# Patient Record
Sex: FEMALE
[De-identification: names, ages, dates, MRNs, and addresses within clinical notes are randomized per-mention and may not be internally consistent; named-entity substitution may affect disease eponyms.]

---

## 2022-07-20 PROBLEM — Z00.00 ENCOUNTER FOR PREVENTIVE HEALTH EXAMINATION: Status: ACTIVE | Noted: 2022-07-20

## 2022-08-02 ENCOUNTER — APPOINTMENT (OUTPATIENT)
Dept: BREAST CENTER | Facility: CLINIC | Age: 27
End: 2022-08-02

## 2022-08-02 VITALS
BODY MASS INDEX: 24.75 KG/M2 | WEIGHT: 145 LBS | SYSTOLIC BLOOD PRESSURE: 102 MMHG | HEIGHT: 64 IN | DIASTOLIC BLOOD PRESSURE: 61 MMHG | HEART RATE: 64 BPM

## 2022-08-02 DIAGNOSIS — N64.4 MASTODYNIA: ICD-10-CM

## 2022-08-02 DIAGNOSIS — Z78.9 OTHER SPECIFIED HEALTH STATUS: ICD-10-CM

## 2022-08-02 DIAGNOSIS — N63.10 UNSPECIFIED LUMP IN THE RIGHT BREAST, UNSPECIFIED QUADRANT: ICD-10-CM

## 2022-08-02 DIAGNOSIS — Z63.5 DISRUPTION OF FAMILY BY SEPARATION AND DIVORCE: ICD-10-CM

## 2022-08-02 DIAGNOSIS — N63.20 UNSPECIFIED LUMP IN THE RIGHT BREAST, UNSPECIFIED QUADRANT: ICD-10-CM

## 2022-08-02 DIAGNOSIS — Z80.3 FAMILY HISTORY OF MALIGNANT NEOPLASM OF BREAST: ICD-10-CM

## 2022-08-02 DIAGNOSIS — Z80.9 FAMILY HISTORY OF MALIGNANT NEOPLASM, UNSPECIFIED: ICD-10-CM

## 2022-08-02 PROCEDURE — 99204 OFFICE O/P NEW MOD 45 MIN: CPT

## 2022-08-02 RX ORDER — CLOBETASOL PROPIONATE 0.5 MG/ML
0.05 SOLUTION TOPICAL
Qty: 100 | Refills: 0 | Status: DISCONTINUED | COMMUNITY
Start: 2022-04-27

## 2022-08-02 RX ORDER — FLUTICASONE FUROATE 200 UG/1
200 POWDER RESPIRATORY (INHALATION)
Qty: 30 | Refills: 0 | Status: DISCONTINUED | COMMUNITY
Start: 2022-05-02

## 2022-08-02 RX ORDER — SPIRONOLACTONE 50 MG/1
50 TABLET ORAL
Qty: 180 | Refills: 0 | Status: DISCONTINUED | COMMUNITY
Start: 2022-07-23

## 2022-08-02 RX ORDER — ALBUTEROL SULFATE 90 UG/1
108 (90 BASE) INHALANT RESPIRATORY (INHALATION)
Qty: 7 | Refills: 0 | Status: DISCONTINUED | COMMUNITY
Start: 2022-05-02

## 2022-08-02 RX ORDER — BUDESONIDE, GLYCOPYRROLATE, AND FORMOTEROL FUMARATE 160; 9; 4.8 UG/1; UG/1; UG/1
160-9-4.8 AEROSOL, METERED RESPIRATORY (INHALATION)
Qty: 11 | Refills: 0 | Status: DISCONTINUED | COMMUNITY
Start: 2022-04-14

## 2022-08-02 SDOH — SOCIAL STABILITY - SOCIAL INSECURITY: DISRUPTION OF FAMILY BY SEPARATION AND DIVORCE: Z63.5

## 2022-08-02 NOTE — PAST MEDICAL HISTORY
[Menarche Age ____] : age at menarche was [unfilled] [Regular Cycle Intervals] : have been regular [Total Preg ___] : G[unfilled] [Live Births ___] : P[unfilled]  [Age At Live Birth ___] : Age at live birth: [unfilled] [FreeTextEntry8] : 1 yr

## 2022-08-02 NOTE — CONSULT LETTER
[Dear  ___] : Dear  [unfilled], [Consult Letter:] : I had the pleasure of evaluating your patient, [unfilled]. [Please see my note below.] : Please see my note below. [Consult Closing:] : Thank you very much for allowing me to participate in the care of this patient.  If you have any questions, please do not hesitate to contact me. [Sincerely,] : Sincerely, [FreeTextEntry3] : Jessica Restrepo MD FACS

## 2022-08-02 NOTE — PHYSICAL EXAM
[Normocephalic] : normocephalic [Atraumatic] : atraumatic [Sclera nonicteric] : sclera nonicteric [Supple] : supple [No Supraclavicular Adenopathy] : no supraclavicular adenopathy [No Cervical Adenopathy] : no cervical adenopathy [Clear to Auscultation Bilat] : clear to auscultation bilaterally [Normal Sinus Rhythm] : normal sinus rhythm [Examined in the supine and seated position] : examined in the supine and seated position [Bra Size: ___] : Bra Size: [unfilled] [No dominant masses] : no dominant masses in right breast  [No dominant masses] : no dominant masses left breast [No Nipple Retraction] : no left nipple retraction [No Nipple Discharge] : no left nipple discharge [No Axillary Lymphadenopathy] : no left axillary lymphadenopathy [Soft] : abdomen soft [No Edema] : no edema [No Rashes] : no rashes [No Ulceration] : no ulceration [de-identified] : Wise pattern scar [de-identified] : Wise pattern scar

## 2022-08-02 NOTE — HISTORY OF PRESENT ILLNESS
[FreeTextEntry1] : Ms. Cowan is a 27 year old woman who presents for a consultation for bilateral breast pain. Her breast history is notable for a breast reduction in 2019, followed by another breast reduction in 2021 (Dr. Walker). Since then, she has felt a lump the size of a simona pit in the lateral aspect of the breast bilaterally which is associated with soreness to pressure that is worse when she is training in dancing or wearing a tight bra. Advil is without any effect. No nipple discharge or skin changes. \par \par Her family history is significant for breast cancer in the paternal grandmother in her 50's.